# Patient Record
Sex: FEMALE | Race: WHITE
[De-identification: names, ages, dates, MRNs, and addresses within clinical notes are randomized per-mention and may not be internally consistent; named-entity substitution may affect disease eponyms.]

---

## 2018-03-26 ENCOUNTER — HOSPITAL ENCOUNTER (EMERGENCY)
Dept: HOSPITAL 62 - ER | Age: 43
Discharge: HOME | End: 2018-03-26
Payer: MEDICAID

## 2018-03-26 VITALS — DIASTOLIC BLOOD PRESSURE: 86 MMHG | SYSTOLIC BLOOD PRESSURE: 136 MMHG

## 2018-03-26 DIAGNOSIS — R10.30: ICD-10-CM

## 2018-03-26 DIAGNOSIS — R30.0: ICD-10-CM

## 2018-03-26 DIAGNOSIS — R10.9: ICD-10-CM

## 2018-03-26 DIAGNOSIS — O23.01: ICD-10-CM

## 2018-03-26 DIAGNOSIS — O20.0: Primary | ICD-10-CM

## 2018-03-26 DIAGNOSIS — Z3A.01: ICD-10-CM

## 2018-03-26 LAB
ADD MANUAL DIFF: NO
ALBUMIN SERPL-MCNC: 4.5 G/DL (ref 3.5–5)
ALP SERPL-CCNC: 63 U/L (ref 38–126)
ALT SERPL-CCNC: 23 U/L (ref 9–52)
ANION GAP SERPL CALC-SCNC: 11 MMOL/L (ref 5–19)
APPEARANCE UR: (no result)
AST SERPL-CCNC: 19 U/L (ref 14–36)
BASOPHILS # BLD AUTO: 0 10^3/UL (ref 0–0.2)
BASOPHILS NFR BLD AUTO: 0.4 % (ref 0–2)
BILIRUB DIRECT SERPL-MCNC: 0.1 MG/DL (ref 0–0.4)
BILIRUB SERPL-MCNC: 0.3 MG/DL (ref 0.2–1.3)
BILIRUB UR QL STRIP: NEGATIVE
BUN SERPL-MCNC: 9 MG/DL (ref 7–20)
CALCIUM: 9.9 MG/DL (ref 8.4–10.2)
CHLORIDE SERPL-SCNC: 100 MMOL/L (ref 98–107)
CO2 SERPL-SCNC: 29 MMOL/L (ref 22–30)
EOSINOPHIL # BLD AUTO: 0.1 10^3/UL (ref 0–0.6)
EOSINOPHIL NFR BLD AUTO: 1.4 % (ref 0–6)
ERYTHROCYTE [DISTWIDTH] IN BLOOD BY AUTOMATED COUNT: 12.6 % (ref 11.5–14)
GLUCOSE SERPL-MCNC: 88 MG/DL (ref 75–110)
GLUCOSE UR STRIP-MCNC: NEGATIVE MG/DL
HCT VFR BLD CALC: 44 % (ref 36–47)
HGB BLD-MCNC: 14.9 G/DL (ref 12–15.5)
KETONES UR STRIP-MCNC: NEGATIVE MG/DL
LYMPHOCYTES # BLD AUTO: 2.3 10^3/UL (ref 0.5–4.7)
LYMPHOCYTES NFR BLD AUTO: 22.1 % (ref 13–45)
MCH RBC QN AUTO: 31.5 PG (ref 27–33.4)
MCHC RBC AUTO-ENTMCNC: 33.8 G/DL (ref 32–36)
MCV RBC AUTO: 93 FL (ref 80–97)
MONOCYTES # BLD AUTO: 0.8 10^3/UL (ref 0.1–1.4)
MONOCYTES NFR BLD AUTO: 7.9 % (ref 3–13)
NEUTROPHILS # BLD AUTO: 7.2 10^3/UL (ref 1.7–8.2)
NEUTS SEG NFR BLD AUTO: 68.2 % (ref 42–78)
NITRITE UR QL STRIP: NEGATIVE
PH UR STRIP: 7 [PH] (ref 5–9)
PLATELET # BLD: 284 10^3/UL (ref 150–450)
POTASSIUM SERPL-SCNC: 4.1 MMOL/L (ref 3.6–5)
PROT SERPL-MCNC: 7.1 G/DL (ref 6.3–8.2)
PROT UR STRIP-MCNC: 100 MG/DL
RBC # BLD AUTO: 4.71 10^6/UL (ref 3.72–5.28)
SODIUM SERPL-SCNC: 139.8 MMOL/L (ref 137–145)
SP GR UR STRIP: 1.02
TOTAL CELLS COUNTED % (AUTO): 100 %
UROBILINOGEN UR-MCNC: NEGATIVE MG/DL (ref ?–2)
WBC # BLD AUTO: 10.5 10^3/UL (ref 4–10.5)

## 2018-03-26 PROCEDURE — 76817 TRANSVAGINAL US OBSTETRIC: CPT

## 2018-03-26 PROCEDURE — 80053 COMPREHEN METABOLIC PANEL: CPT

## 2018-03-26 PROCEDURE — 85025 COMPLETE CBC W/AUTO DIFF WBC: CPT

## 2018-03-26 PROCEDURE — 99284 EMERGENCY DEPT VISIT MOD MDM: CPT

## 2018-03-26 PROCEDURE — 86901 BLOOD TYPING SEROLOGIC RH(D): CPT

## 2018-03-26 PROCEDURE — 36415 COLL VENOUS BLD VENIPUNCTURE: CPT

## 2018-03-26 PROCEDURE — 84702 CHORIONIC GONADOTROPIN TEST: CPT

## 2018-03-26 PROCEDURE — 81001 URINALYSIS AUTO W/SCOPE: CPT

## 2018-03-26 PROCEDURE — 96365 THER/PROPH/DIAG IV INF INIT: CPT

## 2018-03-26 PROCEDURE — 86900 BLOOD TYPING SEROLOGIC ABO: CPT

## 2018-03-26 PROCEDURE — 87086 URINE CULTURE/COLONY COUNT: CPT

## 2018-03-26 NOTE — ER DOCUMENT REPORT
ED General





- General


Chief Complaint: Vag Bleeding, +preg <12wks


Stated Complaint: BACK PAIN


Time Seen by Provider: 18 15:23


Notes: 





Patient is a 43-year-old female  at approximately 6-8 weeks by LMP who 

presents with vaginal bleeding, suprapubic abdominal pain as well as right-

sided flank pain.  Patient reports that the symptoms started yesterday and have 

been progressively worsening since that time.  She states that she contacted 

the health department and the encouraged her to come to the emergency 

department for further evaluation.  She denies any symptoms similar to this 

during her previous pregnancies.  She does describe the pain in her lower 

abdomen and flank being a dull, constant, throbbing pain.  Nothing improves or 

worsens his pain.  She denies any associated fever or constitutional symptoms.  

She does note dysuria.


TRAVEL OUTSIDE OF THE [place] IN LAST 30 DAYS: No





- Related Data


Allergies/Adverse Reactions: 


 





No Known Allergies Allergy (Verified 18 14:35)


 











Past Medical History





- General


Information source: Patient





- Social History


Smoking Status: Never Smoker


Chew tobacco use (# tins/day): No


Frequency of alcohol use: None


Drug Abuse: None


Lives with: Spouse/Significant other


Family History: Reviewed & Not Pertinent


Patient has suicidal ideation: No


Patient has homicidal ideation: No


Pulmonary Medical History: Reports: Hx Asthma


Renal/ Medical History: Denies: Hx Peritoneal Dialysis


Past Surgical History: Reports: Hx  Section





Review of Systems





- Review of Systems


Notes: 





Constitutional: Negative for fever.


HENT: Negative for sore throat.


Eyes: Negative for visual changes.


Cardiovascular: Negative for chest pain.


Respiratory: Negative for shortness of breath.


Gastrointestinal: Positive for lower abdominal pain and flank pain


Genitourinary: Positive for vaginal bleeding and dysuria


Musculoskeletal: Negative for back pain.


Skin: Negative for rash.


Neurological: Negative for headaches, weakness or numbness.





10 point ROS negative except as marked above and in HPI.





Physical Exam





- Vital signs


Vitals: 


 











Temp Pulse Resp BP Pulse Ox


 


 98.3 F   107 H  17   154/93 H  100 


 


 18 14:36  18 14:36  18 14:36  18 14:36  18 14:36











Interpretation: Tachycardic


Notes: 





PHYSICAL EXAMINATION:





GENERAL: Well-appearing, well-nourished and in no acute distress.





HEAD: Atraumatic, normocephalic.





EYES: Pupils equal round and reactive to light, extraocular movements intact, 

sclera anicteric, conjunctiva are normal.





ENT: nares patent, oropharynx clear without exudates.  Moderately dry mucous 

membranes.





NECK: Normal range of motion, supple without lymphadenopathy





LUNGS: Breath sounds clear to auscultation bilaterally and equal.  No wheezes 

rales or rhonchi.





HEART: Regular tachycardia without murmurs





ABDOMEN: Soft, mild suprapubic abdominal tenderness as well as right CVA 

tenderness no other localized areas of tenderness, normoactive bowel sounds.  

No guarding, no rebound.  No masses appreciated.





EXTREMITIES: Normal range of motion, no pitting or edema.  No cyanosis.





NEUROLOGICAL: No focal neurological deficits. Moves all extremities 

spontaneously and on command.





PSYCH: Moderately anxious, somewhat tearful





SKIN: Warm, Dry, normal turgor, no rashes or lesions noted.





Course





- Re-evaluation


Re-evalutation: 





18 18:54


Patient presents with right low flank pain as well as dysuria consistent with 

pyelonephritis in the setting of a pregnancy of unknown location at this time.  

HCG levels below the zone of demargination.  Patient's abdominal exam shows 

some suprapubic and mild right adnexal tenderness but no rebound or guarding.  

She is otherwise hemodynamically stable.  Rh+ by history.  Awaiting ultrasound 

results.  Will give a dose of IV ceftriaxone, IV fluids and reassess


18 19:14


Transvaginal ultrasound shows question of 6 week intrauterine pregnancy 

although no fetal heart tones.  I have expressed to the patient that this is a 

threatened miscarriage that she is very high risk for miscarriage given that 

there are no fetal heart tones and she is having active vaginal bleeding.  

Anticipatory guidance provided.  She has been started on cephalexin for 

coverage of her pyelonephritis after receiving a dose of ceftriaxone IV fluids.

  Vitals remain within acceptable limits.  At this time will discharge with 

return precautions and follow-up recommendations.  Verbal discharge 

instructions given a the bedside and opportunity for questions given. 

Medication warnings reviewed. Patient is in agreement with this plan and has 

verbalized understanding of return precautions and the need for primary care 

follow-up in the next 24-72 hours.





- Vital Signs


Vital signs: 


 











Temp Pulse Resp BP Pulse Ox


 


 98.8 F   93   18   136/86 H  98 


 


 18 20:12  18 20:12  18 20:12  18 20:12  18 20:12














- Laboratory


Result Diagrams: 


 18 15:55





 18 15:55


Laboratory results interpreted by me: 


 











  18





  15:55 15:55


 


Beta HCG, Quant  516.90 H 


 


Urine Protein   100 H


 


Urine Blood   LARGE H


 


Ur Leukocyte Esterase   MODERATE H














- Diagnostic Test


Radiology reviewed: Reports reviewed





Discharge





- Discharge


Clinical Impression: 


 Threatened miscarriage in early pregnancy, Pyelonephritis





Condition: Stable


Disposition: HOME, SELF-CARE


Additional Instructions: 


You have been diagnosed with a condition called pyelonephritis which is an 

infection involving your kidneys and bladder.  You have been given a dose of 

antibiotics here in the emergency department to help begin to treat this 

infection.  Your also being sent home on antibiotics.  Please start taking 

these later on today when you fill the prescription.  Complete the course even 

if you feel better. Please return if you have persistent vomiting, pass out, 

have worsening pain, become unable to tolerate fluids, or have any other 

symptoms that are concerning to you.  Please follow-up with your primary care 

physician in the next 24-48 hours.





Your ultrasound today shows a possible very early pregnancy although no heart 

rate yet.  With having bleeding at this time, there is a moderate to high 

chance that you will miscarry this pregnancy.  Please follow closely with your 

primary care OB/GYN.  Please return if you develop severe abdominal pain, 

bleeding that goes through more than 2 pads for more than 2 hours, pass out, or 

have any other symptoms that are concerning to you. Please follow-up closely 

with your OBGYN regarding todays visit.


Prescriptions: 


Cephalexin Monohydrate [Keflex 500 mg Capsule] 500 mg PO Q6H 7 Days  capsule

## 2018-03-26 NOTE — ER DOCUMENT REPORT
ED Medical Screen (RME)





- General


Chief Complaint: Vag Bleeding, +preg <12wks


Stated Complaint: BACK PAIN


Time Seen by Provider: 18 15:23


Notes: 





Patient states she is currently approximately 8 weeks pregnant.  States she has 

not had an ultrasound with this pregnancy yet.  She states she is having 

cramping in vaginal bleeding today.


TRAVEL OUTSIDE OF THE [place] IN LAST 30 DAYS: No





- Related Data


Allergies/Adverse Reactions: 


 





No Known Allergies Allergy (Verified 18 14:35)


 











Past Medical History





- Social History


Chew tobacco use (# tins/day): No


Frequency of alcohol use: None


Drug Abuse: None


Pulmonary Medical History: Reports: Hx Asthma


Renal/ Medical History: Denies: Hx Peritoneal Dialysis


Past Surgical History: Reports: Hx  Section





Physical Exam





- Vital signs


Vitals: 





 











Temp Pulse Resp BP Pulse Ox


 


 98.3 F   107 H  17   154/93 H  100 


 


 18 14:36  18 14:36  18 14:36  18 14:36  18 14:36














Course





- Vital Signs


Vital signs: 





 











Temp Pulse Resp BP Pulse Ox


 


 98.3 F   107 H  17   154/93 H  100 


 


 18 14:36  18 14:36  18 14:36  18 14:36  18 14:36

## 2018-03-26 NOTE — RADIOLOGY REPORT (SQ)
EXAM DESCRIPTION:  U/S OB TRANSVAGINAL W/O DOP



COMPLETED DATE/TIME:  3/26/2018 6:52 pm



REASON FOR STUDY:  pain/cramping



COMPARISON:  None.



TECHNIQUE:  Transvaginal static and realtime grayscale images acquired of the pelvis. Additional carlita
cted spectral and color Doppler images recorded. All images stored on PACs.

bHC



LIMITATIONS:  None.



FINDINGS:  A questionable gestational sac of 6 weeks size is present.  Questionable fetal pole pole 6
 weeks 0 days.  Fetal heart motion not confirmed.

UTERUS: No masses or anomalies.  11.4 x 6.6 x 6 cm.

CERVICAL LENGTH: 3 cm.   Closed.

RIGHT ADNEXA: Ovary not seen

No adnexal free fluid.

No adnexal masses.

LEFT ADNEXA: Ovary not seen

No adnexal free fluid.

No adnexal masses.

FREE FLUID: None.

OTHER: No other significant finding.



IMPRESSION:  Questionable early gestation of 6 weeks 0 days.  Follow-up as clinically indicated.

Trimester of pregnancy:  First - 0 to 13 weeks.



TECHNICAL DOCUMENTATION:  JOB ID:  [id number]

  [employer]- All Rights Reserved



Reading location - IP/workstation name: [email or web address]

## 2018-03-30 ENCOUNTER — HOSPITAL ENCOUNTER (EMERGENCY)
Dept: HOSPITAL 62 - ER | Age: 43
Discharge: HOME | End: 2018-03-30
Payer: MEDICAID

## 2018-03-30 VITALS — DIASTOLIC BLOOD PRESSURE: 84 MMHG | SYSTOLIC BLOOD PRESSURE: 129 MMHG

## 2018-03-30 DIAGNOSIS — M54.9: ICD-10-CM

## 2018-03-30 DIAGNOSIS — R10.2: ICD-10-CM

## 2018-03-30 DIAGNOSIS — O03.88: Primary | ICD-10-CM

## 2018-03-30 DIAGNOSIS — J45.909: ICD-10-CM

## 2018-03-30 LAB
ADD MANUAL DIFF: NO
APPEARANCE UR: (no result)
APTT PPP: YELLOW S
BASOPHILS # BLD AUTO: 0 10^3/UL (ref 0–0.2)
BASOPHILS NFR BLD AUTO: 0.4 % (ref 0–2)
BILIRUB UR QL STRIP: NEGATIVE
EOSINOPHIL # BLD AUTO: 0.2 10^3/UL (ref 0–0.6)
EOSINOPHIL NFR BLD AUTO: 1.9 % (ref 0–6)
ERYTHROCYTE [DISTWIDTH] IN BLOOD BY AUTOMATED COUNT: 12.6 % (ref 11.5–14)
GLUCOSE UR STRIP-MCNC: NEGATIVE MG/DL
HCT VFR BLD CALC: 43 % (ref 36–47)
HGB BLD-MCNC: 14.9 G/DL (ref 12–15.5)
KETONES UR STRIP-MCNC: NEGATIVE MG/DL
LYMPHOCYTES # BLD AUTO: 3.3 10^3/UL (ref 0.5–4.7)
LYMPHOCYTES NFR BLD AUTO: 34.4 % (ref 13–45)
MCH RBC QN AUTO: 32.1 PG (ref 27–33.4)
MCHC RBC AUTO-ENTMCNC: 34.5 G/DL (ref 32–36)
MCV RBC AUTO: 93 FL (ref 80–97)
MONOCYTES # BLD AUTO: 0.7 10^3/UL (ref 0.1–1.4)
MONOCYTES NFR BLD AUTO: 7.8 % (ref 3–13)
NEUTROPHILS # BLD AUTO: 5.3 10^3/UL (ref 1.7–8.2)
NEUTS SEG NFR BLD AUTO: 55.5 % (ref 42–78)
NITRITE UR QL STRIP: NEGATIVE
PH UR STRIP: 6 [PH] (ref 5–9)
PLATELET # BLD: 333 10^3/UL (ref 150–450)
PROT UR STRIP-MCNC: 30 MG/DL
RBC # BLD AUTO: 4.63 10^6/UL (ref 3.72–5.28)
SP GR UR STRIP: 1.02
TOTAL CELLS COUNTED % (AUTO): 100 %
UROBILINOGEN UR-MCNC: NEGATIVE MG/DL (ref ?–2)
WBC # BLD AUTO: 9.6 10^3/UL (ref 4–10.5)

## 2018-03-30 PROCEDURE — 84702 CHORIONIC GONADOTROPIN TEST: CPT

## 2018-03-30 PROCEDURE — 36415 COLL VENOUS BLD VENIPUNCTURE: CPT

## 2018-03-30 PROCEDURE — 87086 URINE CULTURE/COLONY COUNT: CPT

## 2018-03-30 PROCEDURE — 99284 EMERGENCY DEPT VISIT MOD MDM: CPT

## 2018-03-30 PROCEDURE — 85025 COMPLETE CBC W/AUTO DIFF WBC: CPT

## 2018-03-30 PROCEDURE — 81001 URINALYSIS AUTO W/SCOPE: CPT

## 2018-03-30 PROCEDURE — 76817 TRANSVAGINAL US OBSTETRIC: CPT

## 2018-03-30 NOTE — RADIOLOGY REPORT (SQ)
EXAM DESCRIPTION:  U/S OB TRANSVAGINAL W/O DOP



COMPLETED DATE/TIME:  3/30/2018 6:16 pm



REASON FOR STUDY:  pregnant, bleeding



COMPARISON:  3/26/2018



TECHNIQUE:  Transvaginal static and realtime grayscale images acquired of the pelvis. Additional carlita
cted spectral and color Doppler images recorded. All images stored on PACs.

bHCG: 160.  On March 26 the value was 517



LIMITATIONS:  None.



FINDINGS:  No intrauterine gestation is present at this time.

UTERUS: No masses or anomalies.  10.2 x 5.6 x 4.7 cm.  Endometrium measures 11 mm.

CERVICAL LENGTH: 3.5 cm.   Closed.

RIGHT ADNEXA: Ovary not seen.

No adnexal free fluid.

No adnexal masses.

LEFT ADNEXA: Ovary not seen.

No adnexal free fluid.

No adnexal masses.

FREE FLUID: None.

OTHER: No other significant finding.



IMPRESSION:  There is no intrauterine gestation at this time.



TECHNICAL DOCUMENTATION:  JOB ID:  [id number]

 2011 [employer]- All Rights Reserved



Reading location - IP/workstation name: [email or web address]